# Patient Record
Sex: FEMALE | Race: ASIAN | Employment: FULL TIME | URBAN - METROPOLITAN AREA
[De-identification: names, ages, dates, MRNs, and addresses within clinical notes are randomized per-mention and may not be internally consistent; named-entity substitution may affect disease eponyms.]

---

## 2024-05-31 ENCOUNTER — OFFICE VISIT (OUTPATIENT)
Dept: PRIMARY CARE | Facility: CLINIC | Age: 29
End: 2024-05-31
Payer: COMMERCIAL

## 2024-05-31 VITALS
BODY MASS INDEX: 24.84 KG/M2 | WEIGHT: 135 LBS | HEART RATE: 59 BPM | OXYGEN SATURATION: 99 % | HEIGHT: 62 IN | DIASTOLIC BLOOD PRESSURE: 70 MMHG | SYSTOLIC BLOOD PRESSURE: 109 MMHG

## 2024-05-31 DIAGNOSIS — E78.5 DYSLIPIDEMIA: ICD-10-CM

## 2024-05-31 DIAGNOSIS — R73.02 IGT (IMPAIRED GLUCOSE TOLERANCE): ICD-10-CM

## 2024-05-31 DIAGNOSIS — Z00.00 WELLNESS EXAMINATION: Primary | ICD-10-CM

## 2024-05-31 DIAGNOSIS — Z00.00 ROUTINE GENERAL MEDICAL EXAMINATION AT A HEALTH CARE FACILITY: ICD-10-CM

## 2024-05-31 DIAGNOSIS — E03.9 HYPOTHYROIDISM, UNSPECIFIED TYPE: ICD-10-CM

## 2024-05-31 DIAGNOSIS — D64.9 ANEMIA, UNSPECIFIED TYPE: ICD-10-CM

## 2024-05-31 LAB
NON-UH HIE A/G RATIO: 1.3
NON-UH HIE ALB: 4.2 G/DL (ref 3.4–5)
NON-UH HIE ALK PHOS: 68 UNIT/L (ref 45–117)
NON-UH HIE BASO COUNT: 0.05 X1000 (ref 0–0.2)
NON-UH HIE BASOS %: 0.7 %
NON-UH HIE BILIRUBIN, TOTAL: 0.4 MG/DL (ref 0.3–1.2)
NON-UH HIE BUN/CREAT RATIO: 11.4
NON-UH HIE BUN: 8 MG/DL (ref 9–23)
NON-UH HIE CALCIUM: 9.5 MG/DL (ref 8.7–10.4)
NON-UH HIE CALCULATED LDL CHOLESTEROL: 100 MG/DL (ref 60–130)
NON-UH HIE CALCULATED OSMOLALITY: 277 MOSM/KG (ref 275–295)
NON-UH HIE CHLORIDE: 107 MMOL/L (ref 98–107)
NON-UH HIE CHOLESTEROL: 169 MG/DL (ref 100–200)
NON-UH HIE CO2, VENOUS: 25 MMOL/L (ref 20–31)
NON-UH HIE CREATININE: 0.7 MG/DL (ref 0.5–0.8)
NON-UH HIE DIFF?: NO
NON-UH HIE DXH ACTIONS: ABNORMAL
NON-UH HIE EOS COUNT: 0.06 X1000 (ref 0–0.5)
NON-UH HIE EOSIN %: 0.8 %
NON-UH HIE GFR AA: >60
NON-UH HIE GLOBULIN: 3.2 G/DL
NON-UH HIE GLOMERULAR FILTRATION RATE: >60 ML/MIN/1.73M?
NON-UH HIE GLUCOSE: 86 MG/DL (ref 74–106)
NON-UH HIE GOT: 22 UNIT/L (ref 15–37)
NON-UH HIE GPT: 17 UNIT/L (ref 10–49)
NON-UH HIE HCT: 34 % (ref 36–46)
NON-UH HIE HDL CHOLESTEROL: 53 MG/DL (ref 40–60)
NON-UH HIE HEM PATH REVIEW: ABNORMAL
NON-UH HIE HGB A1C: 5 %
NON-UH HIE HGB: 11 G/DL (ref 12–16)
NON-UH HIE INSTR WBC: 7.2
NON-UH HIE K: 4.7 MMOL/L (ref 3.5–5.1)
NON-UH HIE LYMPH %: 33.6 %
NON-UH HIE LYMPH COUNT: 2.44 X1000 (ref 1.2–4.8)
NON-UH HIE MCH: 21.7 PG (ref 27–34)
NON-UH HIE MCHC: 32.4 G/DL (ref 32–37)
NON-UH HIE MCV: 67.1 FL (ref 80–100)
NON-UH HIE MONO %: 6.6 %
NON-UH HIE MONO COUNT: 0.48 X1000 (ref 0.1–1)
NON-UH HIE MPV: 9.9 FL (ref 7.4–10.4)
NON-UH HIE NA: 140 MMOL/L (ref 135–145)
NON-UH HIE NEUTROPHIL %: 58.2 %
NON-UH HIE NEUTROPHIL COUNT (ANC): 4.22 X1000 (ref 1.4–8.8)
NON-UH HIE NUCLEATED RBC: 0 /100WBC
NON-UH HIE PLATELET: 273 X10 (ref 150–450)
NON-UH HIE RBC: 5.07 X10 (ref 4.2–5.4)
NON-UH HIE RDW: 16.3 % (ref 11.5–14.5)
NON-UH HIE RED BLOOD CELL MORPHOLOGY: ABNORMAL
NON-UH HIE SCAN DIFFERENTIAL: ABNORMAL
NON-UH HIE TOTAL CHOL/HDL CHOL RATIO: 3.2
NON-UH HIE TOTAL PROTEIN: 7.4 G/DL (ref 5.7–8.2)
NON-UH HIE TRIGLYCERIDES: 80 MG/DL (ref 30–150)
NON-UH HIE TSH: 1.11 UIU/ML (ref 0.55–4.78)
NON-UH HIE WBC: 7.2 X10 (ref 4.5–11)

## 2024-05-31 PROCEDURE — 99385 PREV VISIT NEW AGE 18-39: CPT | Performed by: EMERGENCY MEDICINE

## 2024-05-31 PROCEDURE — 1036F TOBACCO NON-USER: CPT | Performed by: EMERGENCY MEDICINE

## 2024-05-31 PROCEDURE — G0444 DEPRESSION SCREEN ANNUAL: HCPCS | Performed by: EMERGENCY MEDICINE

## 2024-05-31 NOTE — PROGRESS NOTES
"Subjective   Patient ID: Sultana Blunt is a 28 y.o. female who presents for Establish Care.    Assessment/Plan   Problem List Items Addressed This Visit    None    Trouble conceiving-counseled about ovulatory indicators    Routine lab work     follow-up as needed      PH Q-9 depression screening was completed by authorized employee of the practice and  explained the questionnaire and discussed the answers with the patient.    I screened for alcohol use        Source of history: Nurse, Medical personnel, Medical record, Patient.  History limitation: None.    HPI  28-year-old here for new patient physical    No acute medical issues    Past health history-negative    Family history-negative    Denies smoking alcohol or drugs  No Known Allergies    No current outpatient medications on file.     No current facility-administered medications for this visit.       Objective   Visit Vitals  /70   Pulse 59   Ht 1.575 m (5' 2\")   Wt 61.2 kg (135 lb)   SpO2 99%   BMI 24.69 kg/m²   Smoking Status Never   BSA 1.64 m²     Physical Exam  Vital signs as per nursing/MA documentation  General appearance: Alert and in no acute distress  HEENT: Normal Inspection  Neck - Normal Inspection  Respiratory : No respiratory distress. Lungs are clear   Cardiovascular: heart rate normal. No gallop  Back - normal inspection  Skin inspection:Warm  Musculoskeletal : No deformities  Neuro : Limited exam. Baseline    Review of Systems   Comprehensive review of systems as allowed by patient condition and nursing input is negative    Results including lab work, imaging reports were reviewed and wherever possible, independently verified    No family history on file.  Social History     Socioeconomic History    Marital status:      Spouse name: None    Number of children: None    Years of education: None    Highest education level: None   Occupational History    None   Tobacco Use    Smoking status: Never    Smokeless tobacco: Never   Vaping " Use    Vaping status: Never Used   Substance and Sexual Activity    Alcohol use: Yes     Comment: social    Drug use: Never    Sexual activity: None   Other Topics Concern    None   Social History Narrative    None     Social Determinants of Health     Financial Resource Strain: Not on file   Food Insecurity: Not on file   Transportation Needs: Not on file   Physical Activity: Not on file   Stress: Not on file   Social Connections: Not on file   Intimate Partner Violence: Not on file   Housing Stability: Not on file     History reviewed. No pertinent past medical history.  History reviewed. No pertinent surgical history.    Charting was completed using voice recognition technology and may include unintended errors.

## 2024-06-03 LAB — NON-UH HIE DIFF REVIEW INTERP: NORMAL

## 2024-10-07 ENCOUNTER — APPOINTMENT (OUTPATIENT)
Dept: PRIMARY CARE | Facility: CLINIC | Age: 29
End: 2024-10-07
Payer: COMMERCIAL

## 2024-10-07 VITALS
OXYGEN SATURATION: 98 % | WEIGHT: 143 LBS | HEART RATE: 94 BPM | SYSTOLIC BLOOD PRESSURE: 118 MMHG | DIASTOLIC BLOOD PRESSURE: 74 MMHG | BODY MASS INDEX: 26.31 KG/M2 | HEIGHT: 62 IN

## 2024-10-07 DIAGNOSIS — Z34.90 PREGNANCY, UNSPECIFIED GESTATIONAL AGE (HHS-HCC): Primary | ICD-10-CM

## 2024-10-07 DIAGNOSIS — R11.0 NAUSEA: ICD-10-CM

## 2024-10-07 PROCEDURE — 3008F BODY MASS INDEX DOCD: CPT | Performed by: EMERGENCY MEDICINE

## 2024-10-07 PROCEDURE — 1036F TOBACCO NON-USER: CPT | Performed by: EMERGENCY MEDICINE

## 2024-10-07 PROCEDURE — 99213 OFFICE O/P EST LOW 20 MIN: CPT | Performed by: EMERGENCY MEDICINE

## 2024-10-07 RX ORDER — ONDANSETRON 4 MG/1
4 TABLET, FILM COATED ORAL EVERY 8 HOURS PRN
Qty: 21 TABLET | Refills: 1 | Status: SHIPPED | OUTPATIENT
Start: 2024-10-07 | End: 2024-10-21

## 2024-10-07 RX ORDER — METOCLOPRAMIDE 10 MG/1
10 TABLET ORAL 4 TIMES DAILY PRN
Qty: 30 TABLET | Refills: 0 | Status: SHIPPED | OUTPATIENT
Start: 2024-10-07 | End: 2024-12-06

## 2024-10-07 ASSESSMENT — PATIENT HEALTH QUESTIONNAIRE - PHQ9
1. LITTLE INTEREST OR PLEASURE IN DOING THINGS: NOT AT ALL
2. FEELING DOWN, DEPRESSED OR HOPELESS: NOT AT ALL
SUM OF ALL RESPONSES TO PHQ9 QUESTIONS 1 AND 2: 0

## 2024-10-07 NOTE — PROGRESS NOTES
Subjective   Patient ID: Sultana Blunt is a 29 y.o. female who presents for pregnancy question .    Assessment/Plan   Problem List Items Addressed This Visit    None  Patient presents for follow up visit    Trouble conceiving - Resolved, patient states that she is pregnant. She follows up with gynecologist.    Nausea and vomiting - Patient is going on a trip and is worried about pregnancy induced nausea. We will prescribe zofran to be taken as necessary.    Routine lab work     follow-up as needed    Source of history: Nurse, Medical personnel, Medical record, Patient.  History limitation: None.    HPI  Patient presents for follow up visit    No acute medical issues    Patient had some questions regarding her pregnancy such as her ability to travel and diet considerations. These queries were addressed.    Past health history-negative    Family history-negative    Denies smoking alcohol or drugs  No Known Allergies    Current Outpatient Medications   Medication Sig Dispense Refill    PNV no.95/ferrous fum/folic ac (PRENATAL ORAL) Take by mouth.       No current facility-administered medications for this visit.       Objective   Physical Exam  Vital signs as per nursing/MA documentation  General appearance: Alert and in no acute distress  HEENT: Normal Inspection  Neck - Normal Inspection  Respiratory : No respiratory distress. Lungs are clear   Cardiovascular: heart rate normal. No gallop  Back - normal inspection  Skin inspection:Warm  Musculoskeletal : No deformities  Neuro : Limited exam. Baseline    Review of Systems   Comprehensive review of systems as allowed by patient condition and nursing input is negative    Results including lab work, imaging reports were reviewed and wherever possible, independently verified    No family history on file.  Social History     Socioeconomic History    Marital status:    Tobacco Use    Smoking status: Never     Passive exposure: Never    Smokeless tobacco: Never    Vaping Use    Vaping status: Never Used   Substance and Sexual Activity    Alcohol use: Not Currently    Drug use: Never     History reviewed. No pertinent past medical history.  History reviewed. No pertinent surgical history.    Charting was completed using voice recognition technology and may include unintended errors.

## 2024-10-17 ENCOUNTER — TRANSCRIBE ORDERS (OUTPATIENT)
Dept: RADIOLOGY | Facility: CLINIC | Age: 29
End: 2024-10-17
Payer: COMMERCIAL

## 2024-10-17 DIAGNOSIS — Z34.90 PREGNANCY, UNSPECIFIED GESTATIONAL AGE (HHS-HCC): Primary | ICD-10-CM

## 2024-12-09 LAB
NON-UH HIE FREE T3: 3.9 PG/ML (ref 2.3–4.2)
NON-UH HIE FREE T4: 1.14 NG/DL (ref 0.89–1.76)
NON-UH HIE TSH: 0.12 UIU/ML (ref 0.55–4.78)

## 2025-01-13 ENCOUNTER — CLINICAL SUPPORT (OUTPATIENT)
Dept: GENETICS | Facility: CLINIC | Age: 30
End: 2025-01-13
Payer: COMMERCIAL

## 2025-01-13 VITALS
SYSTOLIC BLOOD PRESSURE: 113 MMHG | WEIGHT: 146 LBS | BODY MASS INDEX: 26.87 KG/M2 | HEART RATE: 89 BPM | DIASTOLIC BLOOD PRESSURE: 71 MMHG | HEIGHT: 62 IN

## 2025-01-13 DIAGNOSIS — D56.3 BETA THALASSEMIA TRAIT: ICD-10-CM

## 2025-01-13 DIAGNOSIS — O35.2XX0 INHERITABLE DISEASE POSSIBLY AFFECTING PREGNANCY, SINGLE OR UNSPECIFIED FETUS (HHS-HCC): ICD-10-CM

## 2025-01-13 PROCEDURE — 96041 GENETIC COUNSELING SVC EA 30: CPT | Performed by: GENETIC COUNSELOR, MS

## 2025-01-13 ASSESSMENT — PAIN SCALES - GENERAL: PAINLEVEL_OUTOF10: 0-NO PAIN

## 2025-01-22 NOTE — PROGRESS NOTES
"Thank you for the referral of Sultana Blunt.  She is a 29 year old, , female who was 18 3/7 weeks pregnant at the time of our appointment with an EDC of 2025.  She was seen for genetic counseling with her , Waqas Zarco, as they are both carriers of beta thalassemia trait.       PAST HISTORY:  Patient had prenatal carrier and aneuploidy screening via the [place] screen. 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 Testing then reflexed to cfDNA screening of the HBA and HBB genes. This screening was negative, and initially provided a \"low\" fetal risk to be affected with alpha or beta thalassemia. Following this testing, carrier testing was performed for the patient's  for cystic fibrosis, spinal muscular atrophy, as well as alpha and beta hemoglobin traits. This testing was negative for all condition, except beta thalassemia (c.92+5 G>C mutation detected). Phone call placed to testing company, [employer], prior to genetic counseling today and genetic counselor (Ana) confirmed that the patient's 's beta thalassemia mutation is detectable with their reflex fetal cfDNA assay; therefore negative reflex cfDNA screening is still risk reducing; however the report will be updated to reflect an increased a priori risk for this couple to have a child with beta thalassemia (1 in 4) and the residual risk after negative screening will be adjusted to 1 in 400. This amended report will be sent to the ordering provider's office.      FAMILY HISTORY  Medical and family histories were reviewed and no additional concerns were reported. The family history was negative for birth defects, intellectual disability, recurrent pregnancy loss, or recognized inherited conditions.  Consanguinity denied.          SELF REPORTED " RACE/ANCESTRY:  Patient:  Citizen of [place]  Patient's partner:  Citizen of [place]     COUNSELING:    The following information was discussed with your patient:    1. We discussed that both members of this couple were identified as being carriers of beta thalassemia trait. Both of the identified mutations have been reported in individuals with beta zero thalassemia, which can have a more severe phenotype than beta plus thalassemia. We discussed clinical features (primarily anemia, requiring regular transfusions as treatment unless stem cell transplant is performed) and autosomal recessive nature of this condition. As both partners are carriers of this condition, each conception has a 25% chance of being affected with this condition.     2. We discussed that the patient is a carrier of a form of alpha thalassemia (another type of inherited anemia) called the constant spring variant. This is also an autosomal recessive condition. As her  has screened negative for this condition, risk for this couple to have an affected child with alpha thalassemia is expected to be low.     3. We reviewed the patient's [facility] Screening results, as well as details of the methods, benefits, and limitations of this screening. The [facility] screen is a maternal carrier test for the following conditions/genes: CF, SMA, HBB, HBA1/2 that reflexes to cell free DNA screening (fetal testing) in the event that the the patient is identified as a carrier of one of these conditions to determine if the fetus is at risk to be affected with the condition. As the patient screened positive for both alpha and beta thalassemia, fetal testing for these conditions was performed. Paternal carrier testing was performed after initial maternal and fetal results were released. It was confirmed with the laboratory that the fetal testing would have the capability of detecting the paternal mutation; and it was not detected. They cannot comment on whether the fetus inherited  the maternal mutation, as they cannot perform dosage analysis of the region of the gene in which the maternal mutation is detected to determine percentage of DNA that actually was found to have the maternal mutation (amount consistent with only mom having the mutation, or amount consistent with mom AND baby having the mutation). Therefore negative results reduce risk of an affected fetus, but cannot determine carrier status of the fetus or definitely rule out an affected fetus. Per the website, [facility] detects approximately 98.5% of affected pregnancies with the screening methodology.  As with any prenatal screen, false negatives and false positives are possible; therefore diagnostic testing is available prenatally or postnatally to confirm this is an unaffected baby.     4. The [place] screen also includes aneuploidy screening for common aneuploidies via cfDNA obtained from the maternal blood stream. We briefly reviewed that this screening was negative for common chromosome conditions including Down syndrome.         5. The availability, benefits and limitations of ultrasound study. An ultrasound study is recommended at 19-20 weeks gestation to survey fetal organs. An ultrasound study in the second trimester can identify 50% of Down syndrome cases and 90% of trisomy 18 or trisomy 13 cases. Fetuses with beta thalassemia cannot be detected by ultrasound.         6. The availability of diagnostic fetal genetic testing via amniocentesis. The methods, benefits, limitations and risks of amniocentesis and a 1 in 400 risk of complications, including a 1 in 800 risk of miscarriage.    7. Beta thalassemia is included in the [place]  screen. If a fetus does not appear to have adult hemoglobin A at birth, they are suspected to have beta thalassemia. As this is also a screening test, false positive and false negative results may occur. Based on prenatal cfDNA screening, risk of an affected fetus appears to be low; however if  the couple would like  genetic testing after birth to confirm an unaffected fetus, molecular testing may be pursued on an outpatient basis after delivery.     8. We also discussed future reproductive options may be considered including preimplantation genetic testing for beta thalassemia, prenatal screening via UNITY screen, prenatal diagnostic genetic testing, pursuing utilization of donor gametes for conception, or adoption.      9. The American College of of Obstetrics and Gynecology (ACOG, 2017) recommends that carrier screening for cystic fibrosis (CF), spinal muscular atrophy (SMA), and hemoglobinopathies/thalassemias be offered to all pregnant couples. We reviewed the carrier frequencies of these conditions, varied clinical manifestations, and their autosomal recessive inheritance. The patient has already had testing for ACOG recommended conditions.     10. Current guidelines () from the American College of Medical Genetics recommends carrier testing be offered for any condition with a 0.5% (1 in 200) or greater carrier frequency, with a panel of 113 genes suggested. If both members of the couple are found to be carriers for the same condition, there is a 25% chance for an affected child and prenatal diagnosis would be available. Negative carrier screening does not rule out the possibility of being a carrier. [place] screening is available for CF, hemoglobinopathies, and thalassemias, and SMA, an includes 40+ conditions in the state of [place].  We also discussed the availability of more expanded/pan ethnic carrier screening for additional, primarily autosomal recessive, conditions. We discussed the pros and cons of expanded carrier screening including the higher likelihood of being identified as a carrier for at least one condition on a larger panel. Approximately 4% of couples are found to be at risk to have a child with a genetic disorder based on this screening. In rare cases, expanded carrier  screening results may have health implications for the tested individual.      11. Lastly, we discussed that carriers of beta and alpha thalassemia may have a mild microcytic anemia themselves that can be attributed to iron deficiency without assessing actual iron status. We recommend that iron studies be performed prior to prescribing iron supplementation for individuals that have a microcytic anemia that may be associated with their thalassemia trait status.                  DISPOSITION:  The patient stated that she understood the above information and DECLINES diagnostic genetic testing for beta thalassemia at this time. Fetal anatomy ultrasound is recommended at 19-20 weeks gestation.  genetic testing is available, if desired; however screening for beta thalassemia is included in the [place]  screen.             Total time Laly Patel MS, [facility] spent on day of encounter: 110 minutes (65 minutes with patient, 45 minutes on pre/post patient care activities, including documentation).    Thank you for allowing us to participate in the care of your patient.  Should you or your patient have any questions, please do not hesitate to contact our office at [phone].      Sincerely,      Laly Patel MS  Licensed Genetic Counselor

## 2025-01-29 LAB
NON-UH HIE FREE T3: 3.4 PG/ML (ref 2.3–4.2)
NON-UH HIE FREE T4: 1.1 NG/DL (ref 0.89–1.76)
NON-UH HIE TSH: 0.34 UIU/ML (ref 0.55–4.78)

## 2025-01-31 LAB — NON-UH HIE TSH RECEPTOR ANTIBODY: <1.1 IU/L

## 2025-02-19 ENCOUNTER — APPOINTMENT (OUTPATIENT)
Dept: RADIOLOGY | Facility: CLINIC | Age: 30
End: 2025-02-19
Payer: COMMERCIAL

## 2025-02-19 ENCOUNTER — HOSPITAL ENCOUNTER (OUTPATIENT)
Dept: RADIOLOGY | Facility: CLINIC | Age: 30
Discharge: HOME | End: 2025-02-19
Payer: COMMERCIAL

## 2025-02-19 DIAGNOSIS — D56.3 ALPHA THALASSEMIA TRAIT: ICD-10-CM

## 2025-02-19 DIAGNOSIS — Z34.90 PREGNANCY, UNSPECIFIED GESTATIONAL AGE (HHS-HCC): ICD-10-CM

## 2025-02-19 PROCEDURE — 76811 OB US DETAILED SNGL FETUS: CPT

## 2025-02-19 PROCEDURE — 76811 OB US DETAILED SNGL FETUS: CPT | Performed by: OBSTETRICS & GYNECOLOGY

## 2025-03-06 LAB
NON-UH HIE 1 HR POST COLA GESTATIONAL GLUCOSE: 109 MG/DL (ref 120–135)
NON-UH HIE DXH ACTIONS: ABNORMAL
NON-UH HIE HCT: 31.9 % (ref 36–46)

## 2025-03-07 LAB — NON-UH HIE RPR: NORMAL

## 2025-04-14 ENCOUNTER — TELEMEDICINE (OUTPATIENT)
Dept: PRIMARY CARE | Facility: CLINIC | Age: 30
End: 2025-04-14
Payer: COMMERCIAL

## 2025-04-14 VITALS — HEIGHT: 62 IN | BODY MASS INDEX: 28.4 KG/M2 | WEIGHT: 154.32 LBS

## 2025-04-14 DIAGNOSIS — A49.9 BACTERIAL INFECTION: ICD-10-CM

## 2025-04-14 DIAGNOSIS — Z3A.32 32 WEEKS GESTATION OF PREGNANCY (HHS-HCC): ICD-10-CM

## 2025-04-14 DIAGNOSIS — R05.9 COUGH, UNSPECIFIED TYPE: Primary | ICD-10-CM

## 2025-04-14 PROCEDURE — 3008F BODY MASS INDEX DOCD: CPT | Performed by: EMERGENCY MEDICINE

## 2025-04-14 PROCEDURE — 99213 OFFICE O/P EST LOW 20 MIN: CPT | Performed by: EMERGENCY MEDICINE

## 2025-04-14 PROCEDURE — 1036F TOBACCO NON-USER: CPT | Performed by: EMERGENCY MEDICINE

## 2025-04-14 RX ORDER — AZITHROMYCIN 250 MG/1
250 TABLET, FILM COATED ORAL DAILY
Qty: 6 TABLET | Refills: 0 | Status: SHIPPED | OUTPATIENT
Start: 2025-04-14 | End: 2025-04-19

## 2025-04-14 ASSESSMENT — PATIENT HEALTH QUESTIONNAIRE - PHQ9
1. LITTLE INTEREST OR PLEASURE IN DOING THINGS: NOT AT ALL
SUM OF ALL RESPONSES TO PHQ9 QUESTIONS 1 AND 2: 0
2. FEELING DOWN, DEPRESSED OR HOPELESS: NOT AT ALL

## 2025-04-14 ASSESSMENT — ENCOUNTER SYMPTOMS: SORE THROAT: 1

## 2025-04-14 NOTE — PROGRESS NOTES
Subjective   Patient ID: Sultana Blunt is a 29 y.o. female who presents for congested and Sore Throat.    Assessment/Plan   Problem List Items Addressed This Visit    None  Patient presents for follow up visit    Sinusitis/[id number].  This is okay in pregnancy    32 weeks pregnant-under care of an OB/GYN physician    Nausea and vomiting - Patient is going on a trip and is worried about pregnancy induced nausea. We will prescribe zofran to be taken as necessary.    Routine lab work     follow-up as needed    Source of history: Nurse, Medical personnel, Medical record, Patient.  History limitation: None.    Sore Throat      This visit was completed virtually due to the restrictions of the COVID-19 pandemic. All issues as below were discussed and addressed but no physical exam was performed. If it was felt that the patient should be evaluated in clinic or ER, then they were directed there. The patient verbally consented to visit    Patient presents for follow up visit    No acute medical issues    Patient had some questions regarding her pregnancy such as her ability to travel and diet considerations. These queries were addressed.    Past health history-negative    Family history-negative    Denies smoking alcohol or drugs  No Known Allergies    Current Outpatient Medications   Medication Sig Dispense Refill    PNV no.95/ferrous fum/folic ac (PRENATAL ORAL) Take by mouth.      metoclopramide (Reglan) 10 mg tablet Take 1 tablet (10 mg) by mouth 4 times a day as needed (nausea). 30 tablet 0     No current facility-administered medications for this visit.       Patient was not physically examined as this is a virtual visit    Review of Systems   HENT:  Positive for sore throat.       Comprehensive review of systems as allowed by patient condition and nursing input is negative    Results including lab work, imaging reports were reviewed and wherever possible, independently verified    No family history on  file.  Social History     Socioeconomic History    Marital status:    Tobacco Use    Smoking status: Never     Passive exposure: Never    Smokeless tobacco: Never   Vaping Use    Vaping status: Never Used   Substance and Sexual Activity    Alcohol use: Not Currently    Drug use: Never     History reviewed. No pertinent past medical history.  History reviewed. No pertinent surgical history.    Charting was completed using voice recognition technology and may include unintended errors.

## 2025-08-22 ENCOUNTER — APPOINTMENT (OUTPATIENT)
Dept: PRIMARY CARE | Facility: CLINIC | Age: 30
End: 2025-08-22
Payer: COMMERCIAL

## 2025-08-22 VITALS
BODY MASS INDEX: 28.16 KG/M2 | OXYGEN SATURATION: 97 % | HEART RATE: 75 BPM | WEIGHT: 153 LBS | DIASTOLIC BLOOD PRESSURE: 75 MMHG | HEIGHT: 62 IN | SYSTOLIC BLOOD PRESSURE: 114 MMHG

## 2025-08-22 DIAGNOSIS — Z00.00 WELLNESS EXAMINATION: Primary | ICD-10-CM

## 2025-08-22 ASSESSMENT — PATIENT HEALTH QUESTIONNAIRE - PHQ9
SUM OF ALL RESPONSES TO PHQ9 QUESTIONS 1 AND 2: 0
1. LITTLE INTEREST OR PLEASURE IN DOING THINGS: NOT AT ALL
2. FEELING DOWN, DEPRESSED OR HOPELESS: NOT AT ALL

## 2025-08-22 ASSESSMENT — ENCOUNTER SYMPTOMS: SORE THROAT: 1

## 2025-09-03 ENCOUNTER — TELEPHONE (OUTPATIENT)
Dept: PRIMARY CARE | Facility: CLINIC | Age: 30
End: 2025-09-03
Payer: COMMERCIAL